# Patient Record
(demographics unavailable — no encounter records)

---

## 2025-03-11 NOTE — PROCEDURE
[de-identified] : Indication: requirement for exam not possible via anterior examination; xerostomia After verbal consent and the administration of an aerosolized oxymetazoline/lidocaine mix, examination was performed with a flexible endoscope placed through the nose which was attached to a video monitoring system. Findings: Nasopharynx: unremarkable Soft palate, lateral and posterior pharyngeal walls: unremarkable Base of tongue & lingual tonsil: minimal retrodisplacement Vallecula: unremarkable Epiglottis: unremarkable Piriform sinuses and pharyngoesophageal junction: unremarkable Arytenoids and AE folds: moderate interarytenoid edema Ventricle and false vocal folds: unremarkable True vocal folds: Smooth free edge; surface without ectasias or edema; normal movement bilaterally with good apposition in phonation Visible subglottis: not vis Narrow-band imaging: not used Other findings: poorly tolerated

## 2025-03-11 NOTE — PHYSICAL EXAM
[Binocular Microscopic Exam] : Binocular microscopic exam was performed [Laryngoscopy Performed] : laryngoscopy was performed, see procedure section for findings [Normal] : no masses and lesions seen, face is symmetric [FreeTextEntry8] : obstructing cerumen removed with suction & hook [FreeTextEntry9] : obstructing cerumen removed with suction & hook

## 2025-03-11 NOTE — HISTORY OF PRESENT ILLNESS
[de-identified] : Dry mouth has been ongoing for 1 year; he also has dry eye. He's tried the entire range of Biotene products. It causes difficulty speaking and chewing but no issues with swallowing. Has been using xylitol lozenges without improvement. No identifiable precipitating events. Reports dry eye. No history of autoimune disease or family history of autoimmune disease.  Also reports nonpulsatile right sided tinnitus. Denies hearing loss.   PMH: On eliquis for DVT, latanoprost for glaucoma.  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  1 session of chemotherapy in 2006. Follows up at [facility].

## 2025-03-14 NOTE — DATA REVIEWED
[de-identified] : Today: testing was deferred to a scheduled appointment [de-identified] : 3/25: neg SSA/B; ess nl CBC, CMP

## 2025-03-14 NOTE — ASSESSMENT
[FreeTextEntry1] : I explained that he may continue using the cevimeline and that it sounds like he had an isolated episode of BPPV; he will come in if this continues.    RTC for a  as scheduled

## 2025-03-14 NOTE — REASON FOR VISIT
[Home] : at home, [unfilled] , at the time of the visit. [Other Location: e.g. Home (Enter Location, City,State)___] : at [unfilled] [Telehealth (audio & video)] : This visit was provided via telehealth using real-time 2-way audio visual technology. [Verbal consent obtained from patient] : the patient, [unfilled] [Subsequent Evaluation] : a subsequent evaluation for [FreeTextEntry2] : vertigo

## 2025-03-14 NOTE — HISTORY OF PRESENT ILLNESS
[de-identified] : Dry mouth has been ongoing for 1 year; he also has dry eye. He's tried the entire range of Biotene products. It causes difficulty speaking and chewing but no issues with swallowing. Has been using xylitol lozenges without improvement. No history of autoimune disease or family history of autoimmune disease. He used the cevimiline with success for a day but stopped after one short episode of spinning vertigo when he rolled over in bed.  Also reports nonpulsatile right sided tinnitus. Denies hearing loss. Pending   PMH: On eliquis for DVT, latanoprost for glaucoma.  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  1 session of chemotherapy in . Follows up at [facility].

## 2025-04-01 NOTE — DATA REVIEWED
[de-identified] : 4/25: nl to severe SNHL AD, nl to mod-sev SNHL AS; WR 90/100 - Immitance testing w/ type A AU [de-identified] : 3/25: neg SSA/B; ess nl CBC, CMP

## 2025-04-01 NOTE — HISTORY OF PRESENT ILLNESS
[de-identified] : Dry mouth has been ongoing for 1 year; he also has dry eye. He's tried the entire range of Biotene products. It causes difficulty speaking and chewing but no issues with swallowing. Has been using xylitol lozenges without improvement. No history of autoimune disease or family history of autoimmune disease. He used the cevimiline with success for a day and hasn't tried it again since.  Also reports nonpulsatile right sided tinnitus. Denies hearing loss.   PMH: On eliquis for DVT, latanoprost for glaucoma.  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  1 session of chemotherapy in 2006. Follows up at [facility].

## 2025-04-09 NOTE — PHYSICAL EXAM
[No Acute Distress] : no acute distress [No Respiratory Distress] : no respiratory distress  [No Accessory Muscle Use] : no accessory muscle use [Clear to Auscultation] : lungs were clear to auscultation bilaterally [Normal Rate] : normal rate  [Regular Rhythm] : with a regular rhythm [Normal S1, S2] : normal S1 and S2 [Soft] : abdomen soft [Non Tender] : non-tender [No Rash] : no rash [Coordination Grossly Intact] : coordination grossly intact [Normal Affect] : the affect was normal [Normal Insight/Judgement] : insight and judgment were intact [de-identified] : Right AKA

## 2025-04-09 NOTE — REVIEW OF SYSTEMS
[Dryness] : dryness [Fever] : no fever [Shortness Of Breath] : no shortness of breath [Wheezing] : no wheezing [Dyspnea on Exertion] : not dyspnea on exertion [Abdominal Pain] : no abdominal pain [Nausea] : no nausea [Constipation] : no constipation [Diarrhea] : no diarrhea [Vomiting] : no vomiting [Dysuria] : no dysuria [Joint Pain] : no joint pain [Itching] : no itching [FreeTextEntry4] : Dry mouth

## 2025-04-09 NOTE — HEALTH RISK ASSESSMENT
[No] : No [0] : 2) Feeling down, depressed, or hopeless: Not at all (0) [Never] : Never [[id number]] : 0

## 2025-04-09 NOTE — PHYSICAL EXAM
[No Acute Distress] : no acute distress [No Respiratory Distress] : no respiratory distress  [No Accessory Muscle Use] : no accessory muscle use [Clear to Auscultation] : lungs were clear to auscultation bilaterally [Normal Rate] : normal rate  [Regular Rhythm] : with a regular rhythm [Normal S1, S2] : normal S1 and S2 [Soft] : abdomen soft [Non Tender] : non-tender [No Rash] : no rash [Coordination Grossly Intact] : coordination grossly intact [Normal Affect] : the affect was normal [Normal Insight/Judgement] : insight and judgment were intact [de-identified] : Right AKA

## 2025-04-09 NOTE — HEALTH RISK ASSESSMENT
[No] : No [0] : 2) Feeling down, depressed, or hopeless: Not at all (0) [Never] : Never [[id number]] : 0
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
## 2025-04-09 NOTE — ASSESSMENT
[Vaccines Reviewed] : Immunizations reviewed today. Please see immunization details in the vaccine log within the immunization flowsheet.  [FreeTextEntry1] :  RTC 1 month for BP review    I have spent 45 minutes on the encounter which excludes time spent on the preventive service

## 2025-05-15 NOTE — HISTORY OF PRESENT ILLNESS
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 Brings in log today to review from home - range is very good - 110s -120s/ 70s-90s.  -Ongoing severe dry mouth and dry eyes. Cevilemine not making a difference. Having a lot of mucous when eating which is bothersome - likely from cevilemine. Has upcoming appt with rheum.  -Needs new shower chair and new script for PT

## 2025-05-15 NOTE — PHYSICAL EXAM
[No Acute Distress] : no acute distress [No Respiratory Distress] : no respiratory distress  [No Accessory Muscle Use] : no accessory muscle use [Clear to Auscultation] : lungs were clear to auscultation bilaterally [Normal Rate] : normal rate  [Regular Rhythm] : with a regular rhythm [Normal S1, S2] : normal S1 and S2 [Soft] : abdomen soft [Non Tender] : non-tender [No Rash] : no rash [Coordination Grossly Intact] : coordination grossly intact [Normal Affect] : the affect was normal [Normal Insight/Judgement] : insight and judgment were intact [de-identified] : Dry mouth  [de-identified] : R AKA

## 2025-05-15 NOTE — REVIEW OF SYSTEMS
[Fever] : no fever [Dryness] : dryness [Chest Pain] : no chest pain [Palpitations] : no palpitations [Lower Ext Edema] : no lower extremity edema [Shortness Of Breath] : no shortness of breath [Cough] : no cough [Dyspnea on Exertion] : not dyspnea on exertion [Abdominal Pain] : no abdominal pain [Nausea] : no nausea [Constipation] : no constipation [Vomiting] : no vomiting [Dysuria] : no dysuria [FreeTextEntry4] : Dry mouth

## 2025-06-06 NOTE — ASSESSMENT
[FreeTextEntry1] :   A And P   85 y old M with PMH of HTN, Recurrent DVT s/p IVC filter tx Eliquis, RLE Sarcoma Dx 2003 s/p chemotherapy , multiple surgeries last AKA 2020 followed by Oncology at [facility] , getting regular imaging last CT chest no lymphadenopathy pulmonary lesion that they monitor and LE MRI with stump with neuroma referred to us for dry eyes and mouth can be multifactorial previous workup showed + LOLIS no other symptoms of systemic SLE, negative Sjogren abs will re evaluate  -cw losagens and eye drops follow up with ophthalmology  and ENT, was prescribed Cevimeline as per records helped in the past patient does not remember it

## 2025-06-06 NOTE — HISTORY OF PRESENT ILLNESS
[FreeTextEntry1] : CC: joint pain   HPI: 85 y old M with PMH of HTN, recurrent DVT with IVC filter tx Eliquis , Glaucoma,  RLE  Sarcoma Dx 2003 s/p chemotherapy , multiple surgeries last AKA 2020 followed by Oncology at [facility]  Sees Dr. Navdeep Cortes, Surgeon at [facility], who does annual imaging for sarcoma BL dry eyes and mouth getting worse evaluated by ENT, followed by ophthalmology was prescribed Cevimeline that patient not taking , does not remember taking it   no photosensitive rash, no oral ulcers, no psoriasis, no sob or chest pain, no trouble swallowing, no cervical lymphadenopathy no sore throat , no blood in urine or stool rest of the review system negative    Current Medications: [place] pharmacy Cevimeline Eliquis - 2.5mg BID Blood pressure medication - he couldn't remember the name - called pharmacy and they didn't have anything listed  FH no known autoimmune disease

## 2025-06-06 NOTE — ASSESSMENT
[FreeTextEntry1] :   A And P   85 y old M with PMH of HTN, Recurrent DVT s/p IVC filter tx Eliquis, RLE Sarcoma Dx 2003 s/p chemotherapy , multiple surgeries last AKA 2020 followed by Oncology at [facility] , getting regular imaging last CT chest no lymphadenopathy pulmonary lesion that they monitor and LE MRI with stump with neuroma referred to us for dry eyes and mouth can be multifactorial previous workup showed + LOLIS no other symptoms of systemic SLE, negative Sjogren abs will re evaluate  -cw losagens and eye drops follow up with ophthalmology  and ENT, was prescribed Cevimeline as per records helped in the past patient does not remember it

## 2025-06-06 NOTE — HISTORY OF PRESENT ILLNESS
[FreeTextEntry1] : CC: joint pain   HPI: 85 y old M with PMH of HTN, recurrent DVT with IVC filter tx Eliquis , Glaucoma,  RLE  Sarcoma Dx 2003 s/p chemotherapy , multiple surgeries last AKA 2020 followed by Oncology at [facility]  Sees Dr. Navdeep Cortes, Surgeon at [facility], who does annual imaging for sarcoma BL dry eyes and mouth getting worse evaluated by ENT, followed by ophthalmology was prescribed Cevimeline that patient not taking , does not remember taking it   no photosensitive rash, no oral ulcers, no psoriasis, no sob or chest pain, no trouble swallowing, no cervical lymphadenopathy no sore throat , no blood in urine or stool rest of the review system negative    Current Medications: [place] pharmacy Cevimeline Eliquis - 2.5mg BID Blood pressure medication - he couldn't remember the name - called pharmacy and they didn't have anything listed  FH no known autoimmune disease

## 2025-06-06 NOTE — PHYSICAL EXAM
[General Appearance - In No Acute Distress] : in no acute distress [PERRL With Normal Accommodation] : pupils were equal in size, round, and reactive to light [Examination Of The Oral Cavity] : the lips and gums were normal [Oropharynx] : the oropharynx was normal [] : the neck was supple [Respiration, Rhythm And Depth] : normal respiratory rhythm and effort [Auscultation Breath Sounds / Voice Sounds] : lungs were clear to auscultation bilaterally [Chest Palpation] : palpation of the chest revealed no abnormalities [Heart Rate And Rhythm] : heart rate was normal and rhythm regular [Heart Sounds] : normal S1 and S2 [Murmurs] : no murmurs [Edema] : there was no peripheral edema [Bowel Sounds] : normal bowel sounds [Abdomen Soft] : soft [Abdomen Tenderness] : non-tender [No Spinal Tenderness] : no spinal tenderness [Musculoskeletal - Swelling] : no joint swelling seen [Motor Tone] : muscle strength and tone were normal [FreeTextEntry1] : L LE with chronic statis skin changes  and edema

## 2025-06-12 NOTE — HISTORY OF PRESENT ILLNESS
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 US was done in ED and it showed the known chronic DVT plus area that may or may not be an acute/evolving clot. Pt does not have and never had swelling, pain/tenderness, or erythema of LLE. Adherent to eliquis. Most likely does not have any acute DVT. Cramps are likely unrelated and have resolved regardless.   -HTN - he takes his candesartan/HCTZ daily. BP now well controlled. Has some degree of memory loss / confusion. We spoke with pharmacy together. Cevilemine is the medication he was given by ENT for the dry mouth / dry eyes which did not help him and caused excessive mucous production while eating - it also has the risk of worsening glaucoma - advised he stops this since it was not effective and may have unwanted side effects.   -Sees heme/onc Dr Neftali Buck for management of DVT and eliquis - has appt upcoming in August.

## 2025-06-12 NOTE — PHYSICAL EXAM
[No Acute Distress] : no acute distress [No Respiratory Distress] : no respiratory distress  [No Accessory Muscle Use] : no accessory muscle use [Clear to Auscultation] : lungs were clear to auscultation bilaterally [Normal Rate] : normal rate  [Regular Rhythm] : with a regular rhythm [Normal S1, S2] : normal S1 and S2 [Soft] : abdomen soft [Non Tender] : non-tender [Coordination Grossly Intact] : coordination grossly intact [Normal Affect] : the affect was normal [Normal Insight/Judgement] : insight and judgment were intact [de-identified] : RLKAREL s/p AKA, prothesis in place

## 2025-06-12 NOTE — REVIEW OF SYSTEMS
[Fever] : no fever [Chest Pain] : no chest pain [Claudication] : no  leg claudication [Lower Ext Edema] : no lower extremity edema [Shortness Of Breath] : no shortness of breath [Cough] : no cough [Dyspnea on Exertion] : not dyspnea on exertion [Dysuria] : no dysuria [Joint Pain] : no joint pain [Skin Rash] : no skin rash

## 2025-06-12 NOTE — HEALTH RISK ASSESSMENT
[No] : No [0] : 2) Feeling down, depressed, or hopeless: Not at all (0) [[id number]] : 0 [Never] : Never

## 2025-06-12 NOTE — HISTORY OF PRESENT ILLNESS
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 US was done in ED and it showed the known chronic DVT plus area that may or may not be an acute/evolving clot. Pt does not have and never had swelling, pain/tenderness, or erythema of LLE. Adherent to eliquis. Most likely does not have any acute DVT. Cramps are likely unrelated and have resolved regardless.   -HTN - he takes his candesartan/HCTZ daily. BP now well controlled. Has some degree of memory loss / confusion. We spoke with pharmacy together. Cevilemine is the medication he was given by ENT for the dry mouth / dry eyes which did not help him and caused excessive mucous production while eating - it also has the risk of worsening glaucoma - advised he stops this since it was not effective and may have unwanted side effects.   -Sees heme/onc Dr Neftali Buck for management of DVT and eliquis - has appt upcoming in August.

## 2025-06-12 NOTE — HEALTH RISK ASSESSMENT
[No] : No [0] : 2) Feeling down, depressed, or hopeless: Not at all (0) [[id number]] : 0 [Never] : Never

## 2025-06-20 NOTE — ASSESSMENT
[FreeTextEntry1] :  A And P   85 y old M with PMH of HTN, Recurrent DVT s/p IVC filter tx Eliquis, RLE Sarcoma Dx 2003 s/p chemotherapy , multiple surgeries last AKA 2020 followed by Oncology at [facility] , getting regular imaging last CT chest no lymphadenopathy pulmonary lesion that they monitor and LE MRI with stump with neuroma referred to us for dry eyes and mouth can be multifactorial previous workup showed + LOLIS no other symptoms of systemic SLE -cw losagens and eye drops follow up with ophthalmology  and ENT, was prescribed Cevimeline as per records helped in the past patient does not remember it  -with workup 6/6/2025 + LOLIS 1:160 , negative SSA and SSB less likely suggestive Active Sjogren disease normal C3 and C4 and negative RF and CCP abs  -also negative DS DNA, JULIA, RNA polymerase III, JULIA, SCL 70, normal thyroids abs, normal CRP and ESR -workup not suggestive of SLE other connective tissue disease and not suggestive of inflammatory arthritis -follow up with us as needed

## 2025-06-20 NOTE — HISTORY OF PRESENT ILLNESS
[FreeTextEntry1] : CC: joint pain , + LOLIS   Initial visit  6/6/2025 HPI: 85 y old M with PMH of HTN, recurrent DVT with IVC filter tx Eliquis , Glaucoma,  RLE  Sarcoma Dx 2003 s/p chemotherapy , multiple surgeries last AKA 2020 followed by Oncology at [facility]  Sees Dr. Navdeep Cortes, Surgeon at [facility], who does annual imaging for sarcoma BL dry eyes and mouth getting worse evaluated by ENT, followed by ophthalmology was prescribed Cevimeline that patient not taking , does not remember taking it   no photosensitive rash, no oral ulcers, no psoriasis, no sob or chest pain, no trouble swallowing, no cervical lymphadenopathy no sore throat , no blood in urine or stool rest of the review system negative    Current Medications: [place] pharmacy Cevimeline Eliquis - 2.5mg BID Blood pressure medication   FH no known autoimmune disease

## 2025-07-20 NOTE — PHYSICAL EXAM
[No Acute Distress] : no acute distress [No Respiratory Distress] : no respiratory distress  [No Accessory Muscle Use] : no accessory muscle use [Clear to Auscultation] : lungs were clear to auscultation bilaterally [Normal Rate] : normal rate  [Regular Rhythm] : with a regular rhythm [Normal S1, S2] : normal S1 and S2 [Soft] : abdomen soft [Non Tender] : non-tender [Coordination Grossly Intact] : coordination grossly intact [Normal Affect] : the affect was normal [Normal Insight/Judgement] : insight and judgment were intact [de-identified] : RLKAREL s/p AKA, prothesis in place

## 2025-07-20 NOTE — HISTORY OF PRESENT ILLNESS
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 +LOLIS. Saw Rheum. Does not seem to be a rheum etiology. -Worsening renal function - GFR 40s. US showed medical renal dz of right kidney and possible of left. Will monitor and discuss referral to renal if continuing to worsen. -Never connected with a new PT after last visit - would like one -Would like scar gel refill to protect stump from prothesis.  -

## 2025-07-20 NOTE — PHYSICAL EXAM
[No Acute Distress] : no acute distress [No Respiratory Distress] : no respiratory distress  [No Accessory Muscle Use] : no accessory muscle use [Clear to Auscultation] : lungs were clear to auscultation bilaterally [Normal Rate] : normal rate  [Regular Rhythm] : with a regular rhythm [Normal S1, S2] : normal S1 and S2 [Soft] : abdomen soft [Non Tender] : non-tender [Coordination Grossly Intact] : coordination grossly intact [Normal Affect] : the affect was normal [Normal Insight/Judgement] : insight and judgment were intact [de-identified] : RLKAREL s/p AKA, prothesis in place

## 2025-07-20 NOTE — REVIEW OF SYSTEMS
[Fever] : no fever [Chest Pain] : no chest pain [Claudication] : no  leg claudication [Lower Ext Edema] : no lower extremity edema [Shortness Of Breath] : no shortness of breath [Cough] : no cough [Dyspnea on Exertion] : not dyspnea on exertion [Dysuria] : no dysuria [Joint Pain] : no joint pain [Skin Rash] : no skin rash [FreeTextEntry4] : Dry mouth and eyes

## 2025-07-20 NOTE — HISTORY OF PRESENT ILLNESS
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 +LLOIS. Saw Rheum. Does not seem to be a rheum etiology. -Worsening renal function - GFR 40s. US showed medical renal dz of right kidney and possible of left. Will monitor and discuss referral to renal if continuing to worsen. -Never connected with a new PT after last visit - would like one -Would like scar gel refill to protect stump from prothesis.  -